# Patient Record
Sex: MALE | Race: WHITE | Employment: FULL TIME | URBAN - METROPOLITAN AREA
[De-identification: names, ages, dates, MRNs, and addresses within clinical notes are randomized per-mention and may not be internally consistent; named-entity substitution may affect disease eponyms.]

---

## 2018-07-10 ENCOUNTER — OFFICE VISIT (OUTPATIENT)
Dept: INTERNAL MEDICINE CLINIC | Facility: CLINIC | Age: 33
End: 2018-07-10

## 2018-07-10 ENCOUNTER — LAB ENCOUNTER (OUTPATIENT)
Dept: LAB | Age: 33
End: 2018-07-10
Attending: INTERNAL MEDICINE
Payer: COMMERCIAL

## 2018-07-10 VITALS
HEART RATE: 60 BPM | WEIGHT: 165 LBS | BODY MASS INDEX: 25.9 KG/M2 | HEIGHT: 67 IN | DIASTOLIC BLOOD PRESSURE: 66 MMHG | SYSTOLIC BLOOD PRESSURE: 110 MMHG | OXYGEN SATURATION: 99 % | TEMPERATURE: 99 F

## 2018-07-10 DIAGNOSIS — Z86.69 HISTORY OF CORNEAL ULCER: ICD-10-CM

## 2018-07-10 DIAGNOSIS — R39.11 DELAYED ONSET OF URINATION: ICD-10-CM

## 2018-07-10 DIAGNOSIS — Z00.00 PHYSICAL EXAM: ICD-10-CM

## 2018-07-10 DIAGNOSIS — Z00.00 PHYSICAL EXAM: Primary | ICD-10-CM

## 2018-07-10 LAB
ALBUMIN SERPL BCP-MCNC: 4.9 G/DL (ref 3.5–4.8)
ALBUMIN/GLOB SERPL: 2 {RATIO} (ref 1–2)
ALP SERPL-CCNC: 70 U/L (ref 32–100)
ALT SERPL-CCNC: 27 U/L (ref 17–63)
ANION GAP SERPL CALC-SCNC: 9 MMOL/L (ref 0–18)
AST SERPL-CCNC: 21 U/L (ref 15–41)
BASOPHILS # BLD: 0 K/UL (ref 0–0.2)
BASOPHILS NFR BLD: 1 %
BILIRUB SERPL-MCNC: 1.8 MG/DL (ref 0.3–1.2)
BILIRUB UR QL: NEGATIVE
BUN SERPL-MCNC: 19 MG/DL (ref 8–20)
BUN/CREAT SERPL: 19.8 (ref 10–20)
CALCIUM SERPL-MCNC: 10 MG/DL (ref 8.5–10.5)
CHLORIDE SERPL-SCNC: 100 MMOL/L (ref 95–110)
CHOLEST SERPL-MCNC: 184 MG/DL (ref 110–200)
CLARITY UR: CLEAR
CO2 SERPL-SCNC: 28 MMOL/L (ref 22–32)
COLOR UR: YELLOW
CREAT SERPL-MCNC: 0.96 MG/DL (ref 0.5–1.5)
EOSINOPHIL # BLD: 0.1 K/UL (ref 0–0.7)
EOSINOPHIL NFR BLD: 3 %
ERYTHROCYTE [DISTWIDTH] IN BLOOD BY AUTOMATED COUNT: 12.3 % (ref 11–15)
GLOBULIN PLAS-MCNC: 2.5 G/DL (ref 2.5–3.7)
GLUCOSE SERPL-MCNC: 87 MG/DL (ref 70–99)
GLUCOSE UR-MCNC: NEGATIVE MG/DL
HCT VFR BLD AUTO: 44.9 % (ref 41–52)
HDLC SERPL-MCNC: 68 MG/DL
HGB BLD-MCNC: 15.8 G/DL (ref 13.5–17.5)
HGB UR QL STRIP.AUTO: NEGATIVE
KETONES UR-MCNC: NEGATIVE MG/DL
LDLC SERPL CALC-MCNC: 105 MG/DL (ref 0–99)
LEUKOCYTE ESTERASE UR QL STRIP.AUTO: NEGATIVE
LYMPHOCYTES # BLD: 1.1 K/UL (ref 1–4)
LYMPHOCYTES NFR BLD: 31 %
MCH RBC QN AUTO: 33.1 PG (ref 27–32)
MCHC RBC AUTO-ENTMCNC: 35.1 G/DL (ref 32–37)
MCV RBC AUTO: 94.2 FL (ref 80–100)
MONOCYTES # BLD: 0.3 K/UL (ref 0–1)
MONOCYTES NFR BLD: 9 %
NEUTROPHILS # BLD AUTO: 2.1 K/UL (ref 1.8–7.7)
NEUTROPHILS NFR BLD: 57 %
NITRITE UR QL STRIP.AUTO: NEGATIVE
NONHDLC SERPL-MCNC: 116 MG/DL
OSMOLALITY UR CALC.SUM OF ELEC: 286 MOSM/KG (ref 275–295)
PATIENT FASTING: YES
PH UR: 5 [PH] (ref 5–8)
PLATELET # BLD AUTO: 248 K/UL (ref 140–400)
PMV BLD AUTO: 9.4 FL (ref 7.4–10.3)
POTASSIUM SERPL-SCNC: 4.9 MMOL/L (ref 3.3–5.1)
PROT SERPL-MCNC: 7.4 G/DL (ref 5.9–8.4)
PROT UR-MCNC: NEGATIVE MG/DL
PSA SERPL-MCNC: 0.7 NG/ML (ref 0–4)
RBC # BLD AUTO: 4.76 M/UL (ref 4.5–5.9)
SODIUM SERPL-SCNC: 137 MMOL/L (ref 136–144)
SP GR UR STRIP: 1.02 (ref 1–1.03)
TRIGL SERPL-MCNC: 54 MG/DL (ref 1–149)
TSH SERPL-ACNC: 1.6 UIU/ML (ref 0.45–5.33)
UROBILINOGEN UR STRIP-ACNC: <2
VIT C UR-MCNC: NEGATIVE MG/DL
WBC # BLD AUTO: 3.6 K/UL (ref 4–11)

## 2018-07-10 PROCEDURE — 84402 ASSAY OF FREE TESTOSTERONE: CPT

## 2018-07-10 PROCEDURE — 80053 COMPREHEN METABOLIC PANEL: CPT

## 2018-07-10 PROCEDURE — 36415 COLL VENOUS BLD VENIPUNCTURE: CPT

## 2018-07-10 PROCEDURE — 85025 COMPLETE CBC W/AUTO DIFF WBC: CPT

## 2018-07-10 PROCEDURE — 84443 ASSAY THYROID STIM HORMONE: CPT

## 2018-07-10 PROCEDURE — 84403 ASSAY OF TOTAL TESTOSTERONE: CPT

## 2018-07-10 PROCEDURE — 80061 LIPID PANEL: CPT

## 2018-07-10 PROCEDURE — 82306 VITAMIN D 25 HYDROXY: CPT

## 2018-07-10 PROCEDURE — 99385 PREV VISIT NEW AGE 18-39: CPT | Performed by: INTERNAL MEDICINE

## 2018-07-10 PROCEDURE — 81003 URINALYSIS AUTO W/O SCOPE: CPT | Performed by: INTERNAL MEDICINE

## 2018-07-10 PROCEDURE — 86812 HLA TYPING A B OR C: CPT

## 2018-07-10 NOTE — PROGRESS NOTES
Diaz Gaming is a 29year old male who presents for a complete physical exam.   HPI:   Pt complains of:    Patient presents with:  New Patient: Pt here today with complaints of feeling but sensation to urinate followed with delay in starting stream dyspnea and wheezing. Cardio: Negative Chest pain and irregular heartbeat/palpitations. GI: Negative for Abdominal pain, constipation, diarrhea, heartburn, nausea and vomiting. : Negative for Dysuria and urinary frequency.   Endocrine: Negative for Col PLATELET; Future  - COMP METABOLIC PANEL (14); Future  - LIPID PANEL; Future  - ASSAY, THYROID STIM HORMONE; Future  - URINALYSIS WITH CULTURE REFLEX  - VITAMIN D, 25-HYDROXY; Future  - PSA SCREEN; Future  - TESTOSTERONE,FREE AND TOTAL;  Future  - HLA B 27

## 2018-07-11 LAB
25(OH)D3 SERPL-MCNC: 30.6 NG/ML
HLA-B27: NEGATIVE

## 2018-07-12 LAB
TESTOSTERONE, FREE, S: 8.72 NG/DL
TESTOSTERONE, TOTAL, S: 323 NG/DL

## 2018-07-13 ENCOUNTER — TELEPHONE (OUTPATIENT)
Dept: INTERNAL MEDICINE CLINIC | Facility: CLINIC | Age: 33
End: 2018-07-13

## 2018-07-13 DIAGNOSIS — R79.89 LOW TESTOSTERONE: Primary | ICD-10-CM

## 2018-12-12 ENCOUNTER — TELEPHONE (OUTPATIENT)
Dept: INTERNAL MEDICINE CLINIC | Facility: CLINIC | Age: 33
End: 2018-12-12

## 2018-12-12 NOTE — TELEPHONE ENCOUNTER
Dr. Puneet Blanchard was working with Jose Jeong to clear inbasket items and Sandra Jerez was not notified in July of Testosterone Lab Test that was ordered, ok to wait until next appt or do you want the clinical staff to reach out to him regarding this test?     Routed to

## 2021-11-05 ENCOUNTER — APPOINTMENT (OUTPATIENT)
Dept: URBAN - METROPOLITAN AREA CLINIC 321 | Age: 36
Setting detail: DERMATOLOGY
End: 2021-11-08

## 2021-11-05 DIAGNOSIS — L72.8 OTHER FOLLICULAR CYSTS OF THE SKIN AND SUBCUTANEOUS TISSUE: ICD-10-CM

## 2021-11-05 PROCEDURE — 11900 INJECT SKIN LESIONS </W 7: CPT

## 2021-11-05 PROCEDURE — OTHER COUNSELING: OTHER

## 2021-11-05 PROCEDURE — OTHER INTRALESIONAL KENALOG: OTHER

## 2021-11-05 ASSESSMENT — LOCATION DETAILED DESCRIPTION DERM: LOCATION DETAILED: NASAL ROOT

## 2021-11-05 ASSESSMENT — LOCATION ZONE DERM: LOCATION ZONE: NOSE

## 2021-11-05 ASSESSMENT — LOCATION SIMPLE DESCRIPTION DERM: LOCATION SIMPLE: NOSE

## 2021-12-03 ENCOUNTER — APPOINTMENT (OUTPATIENT)
Dept: URBAN - METROPOLITAN AREA CLINIC 321 | Age: 36
Setting detail: DERMATOLOGY
End: 2021-12-03

## 2021-12-03 DIAGNOSIS — L72.8 OTHER FOLLICULAR CYSTS OF THE SKIN AND SUBCUTANEOUS TISSUE: ICD-10-CM

## 2021-12-03 PROCEDURE — OTHER COUNSELING: OTHER

## 2021-12-03 PROCEDURE — OTHER INTRALESIONAL KENALOG: OTHER

## 2021-12-03 PROCEDURE — 11900 INJECT SKIN LESIONS </W 7: CPT

## 2021-12-03 ASSESSMENT — LOCATION SIMPLE DESCRIPTION DERM: LOCATION SIMPLE: NOSE

## 2021-12-03 ASSESSMENT — LOCATION DETAILED DESCRIPTION DERM: LOCATION DETAILED: NASAL ROOT

## 2021-12-03 ASSESSMENT — LOCATION ZONE DERM: LOCATION ZONE: NOSE

## 2022-10-18 ENCOUNTER — TELEPHONE (OUTPATIENT)
Dept: INTERNAL MEDICINE CLINIC | Facility: CLINIC | Age: 37
End: 2022-10-18

## 2022-10-18 DIAGNOSIS — Z00.00 ANNUAL PHYSICAL EXAM: Primary | ICD-10-CM

## 2022-10-18 NOTE — TELEPHONE ENCOUNTER
LMTCB.  please inform labs have been ordered and to do them fasting at least 1 week prior to appointment so Dr Maggi Mcginnis can discuss results with him at time of office visit.

## 2022-10-18 NOTE — TELEPHONE ENCOUNTER
See message below received from patient      Annual Physical scheduled with Dr Davian Martines on Nov 7 - Also I would like lab work and discuss what I am looking to monitor.

## 2022-10-31 ENCOUNTER — LAB ENCOUNTER (OUTPATIENT)
Dept: LAB | Facility: HOSPITAL | Age: 37
End: 2022-10-31
Attending: INTERNAL MEDICINE
Payer: COMMERCIAL

## 2022-10-31 DIAGNOSIS — Z00.00 ANNUAL PHYSICAL EXAM: ICD-10-CM

## 2022-10-31 LAB
ALBUMIN SERPL-MCNC: 4.2 G/DL (ref 3.4–5)
ALBUMIN/GLOB SERPL: 1.4 {RATIO} (ref 1–2)
ALP LIVER SERPL-CCNC: 88 U/L
ALT SERPL-CCNC: 45 U/L
ANION GAP SERPL CALC-SCNC: 5 MMOL/L (ref 0–18)
AST SERPL-CCNC: 21 U/L (ref 15–37)
BASOPHILS # BLD AUTO: 0.02 X10(3) UL (ref 0–0.2)
BASOPHILS NFR BLD AUTO: 0.5 %
BILIRUB SERPL-MCNC: 1.2 MG/DL (ref 0.1–2)
BUN BLD-MCNC: 25 MG/DL (ref 7–18)
BUN/CREAT SERPL: 23.4 (ref 10–20)
CALCIUM BLD-MCNC: 9.3 MG/DL (ref 8.5–10.1)
CHLORIDE SERPL-SCNC: 106 MMOL/L (ref 98–112)
CHOLEST SERPL-MCNC: 159 MG/DL (ref ?–200)
CO2 SERPL-SCNC: 27 MMOL/L (ref 21–32)
CREAT BLD-MCNC: 1.07 MG/DL
DEPRECATED RDW RBC AUTO: 42.4 FL (ref 35.1–46.3)
EOSINOPHIL # BLD AUTO: 0.12 X10(3) UL (ref 0–0.7)
EOSINOPHIL NFR BLD AUTO: 3 %
ERYTHROCYTE [DISTWIDTH] IN BLOOD BY AUTOMATED COUNT: 11.8 % (ref 11–15)
FASTING PATIENT LIPID ANSWER: YES
FASTING STATUS PATIENT QL REPORTED: YES
GFR SERPLBLD BASED ON 1.73 SQ M-ARVRAT: 93 ML/MIN/1.73M2 (ref 60–?)
GLOBULIN PLAS-MCNC: 3 G/DL (ref 2.8–4.4)
GLUCOSE BLD-MCNC: 98 MG/DL (ref 70–99)
HCT VFR BLD AUTO: 44.3 %
HDLC SERPL-MCNC: 59 MG/DL (ref 40–59)
HGB BLD-MCNC: 15 G/DL
IMM GRANULOCYTES # BLD AUTO: 0.01 X10(3) UL (ref 0–1)
IMM GRANULOCYTES NFR BLD: 0.3 %
LDLC SERPL CALC-MCNC: 83 MG/DL (ref ?–100)
LYMPHOCYTES # BLD AUTO: 1.04 X10(3) UL (ref 1–4)
LYMPHOCYTES NFR BLD AUTO: 26.1 %
MCH RBC QN AUTO: 33 PG (ref 26–34)
MCHC RBC AUTO-ENTMCNC: 33.9 G/DL (ref 31–37)
MCV RBC AUTO: 97.4 FL
MONOCYTES # BLD AUTO: 0.45 X10(3) UL (ref 0.1–1)
MONOCYTES NFR BLD AUTO: 11.3 %
NEUTROPHILS # BLD AUTO: 2.34 X10 (3) UL (ref 1.5–7.7)
NEUTROPHILS # BLD AUTO: 2.34 X10(3) UL (ref 1.5–7.7)
NEUTROPHILS NFR BLD AUTO: 58.8 %
NONHDLC SERPL-MCNC: 100 MG/DL (ref ?–130)
OSMOLALITY SERPL CALC.SUM OF ELEC: 290 MOSM/KG (ref 275–295)
PLATELET # BLD AUTO: 258 10(3)UL (ref 150–450)
POTASSIUM SERPL-SCNC: 4.3 MMOL/L (ref 3.5–5.1)
PROT SERPL-MCNC: 7.2 G/DL (ref 6.4–8.2)
RBC # BLD AUTO: 4.55 X10(6)UL
SODIUM SERPL-SCNC: 138 MMOL/L (ref 136–145)
TRIGL SERPL-MCNC: 95 MG/DL (ref 30–149)
TSI SER-ACNC: 1.36 MIU/ML (ref 0.36–3.74)
VIT D+METAB SERPL-MCNC: 33.9 NG/ML (ref 30–100)
VLDLC SERPL CALC-MCNC: 15 MG/DL (ref 0–30)
WBC # BLD AUTO: 4 X10(3) UL (ref 4–11)

## 2022-10-31 PROCEDURE — 80061 LIPID PANEL: CPT

## 2022-10-31 PROCEDURE — 85025 COMPLETE CBC W/AUTO DIFF WBC: CPT

## 2022-10-31 PROCEDURE — 84403 ASSAY OF TOTAL TESTOSTERONE: CPT

## 2022-10-31 PROCEDURE — 80053 COMPREHEN METABOLIC PANEL: CPT

## 2022-10-31 PROCEDURE — 82306 VITAMIN D 25 HYDROXY: CPT

## 2022-10-31 PROCEDURE — 84402 ASSAY OF FREE TESTOSTERONE: CPT

## 2022-10-31 PROCEDURE — 84443 ASSAY THYROID STIM HORMONE: CPT

## 2022-10-31 PROCEDURE — 36415 COLL VENOUS BLD VENIPUNCTURE: CPT

## 2022-11-03 ENCOUNTER — OFFICE VISIT (OUTPATIENT)
Dept: INTERNAL MEDICINE CLINIC | Facility: CLINIC | Age: 37
End: 2022-11-03
Payer: COMMERCIAL

## 2022-11-03 VITALS
OXYGEN SATURATION: 99 % | HEIGHT: 65.6 IN | WEIGHT: 164.63 LBS | DIASTOLIC BLOOD PRESSURE: 66 MMHG | HEART RATE: 67 BPM | TEMPERATURE: 98 F | BODY MASS INDEX: 26.77 KG/M2 | SYSTOLIC BLOOD PRESSURE: 120 MMHG

## 2022-11-03 DIAGNOSIS — Z00.00 PHYSICAL EXAM: Primary | ICD-10-CM

## 2022-11-03 PROCEDURE — 3008F BODY MASS INDEX DOCD: CPT | Performed by: INTERNAL MEDICINE

## 2022-11-03 PROCEDURE — 90471 IMMUNIZATION ADMIN: CPT | Performed by: INTERNAL MEDICINE

## 2022-11-03 PROCEDURE — 99395 PREV VISIT EST AGE 18-39: CPT | Performed by: INTERNAL MEDICINE

## 2022-11-03 PROCEDURE — 3074F SYST BP LT 130 MM HG: CPT | Performed by: INTERNAL MEDICINE

## 2022-11-03 PROCEDURE — 90686 IIV4 VACC NO PRSV 0.5 ML IM: CPT | Performed by: INTERNAL MEDICINE

## 2022-11-03 PROCEDURE — 3078F DIAST BP <80 MM HG: CPT | Performed by: INTERNAL MEDICINE

## 2022-11-03 NOTE — PATIENT INSTRUCTIONS
1. Physical exam  Physical exam instruction: Improve diet and exercise, lets wait on the finality of the lab tests, I do like the idea of continue to clean eating, maybe boosting up the vitamin D by taking a supplement of 2555-0959 international units daily, find a reputable brand online. Also all the increasing fertility measures should be implemented at this point, and if there is trouble with fertility, let me know.

## 2022-11-08 LAB
TESTOSTERONE, FREE, S: 17.2 NG/DL
TESTOSTERONE, TOTAL, S: 484 NG/DL

## 2023-03-20 ENCOUNTER — PATIENT MESSAGE (OUTPATIENT)
Dept: INTERNAL MEDICINE CLINIC | Facility: CLINIC | Age: 38
End: 2023-03-20

## 2023-03-21 NOTE — TELEPHONE ENCOUNTER
From: Garrick Lee  To: Kell Ortega DO  Sent: 3/20/2023 8:09 PM CDT  Subject: Trouble getting erection    Hi Doc,   My wife and I trying to have a baby as of last month. I have never had a problem getting an erection and the past 6 weeks it has been a massive struggle. To the point where I just can't get it to happen, no matter what. I have had some borderline low T results in the past and I am wanting to talk to you about this ongoing situation as it is becoming very challenging for us. Let me know if you want me to make an appointment or what you would like me to do.      Thank you,   Beni Villarreal

## 2023-05-23 ENCOUNTER — OFFICE VISIT (OUTPATIENT)
Dept: INTERNAL MEDICINE CLINIC | Facility: CLINIC | Age: 38
End: 2023-05-23

## 2023-05-23 VITALS
HEIGHT: 65.6 IN | HEART RATE: 52 BPM | OXYGEN SATURATION: 99 % | SYSTOLIC BLOOD PRESSURE: 120 MMHG | WEIGHT: 154 LBS | DIASTOLIC BLOOD PRESSURE: 82 MMHG | BODY MASS INDEX: 25.05 KG/M2 | TEMPERATURE: 99 F

## 2023-05-23 DIAGNOSIS — N52.9 ERECTILE DYSFUNCTION, UNSPECIFIED ERECTILE DYSFUNCTION TYPE: Primary | ICD-10-CM

## 2023-05-23 DIAGNOSIS — Z31.41 FERTILITY TESTING: ICD-10-CM

## 2023-05-23 PROCEDURE — 3008F BODY MASS INDEX DOCD: CPT | Performed by: INTERNAL MEDICINE

## 2023-05-23 PROCEDURE — 99214 OFFICE O/P EST MOD 30 MIN: CPT | Performed by: INTERNAL MEDICINE

## 2023-05-23 PROCEDURE — 3074F SYST BP LT 130 MM HG: CPT | Performed by: INTERNAL MEDICINE

## 2023-05-23 PROCEDURE — 3079F DIAST BP 80-89 MM HG: CPT | Performed by: INTERNAL MEDICINE

## 2023-05-23 RX ORDER — SILDENAFIL CITRATE 20 MG/1
TABLET ORAL DAILY PRN
Qty: 30 TABLET | Refills: 1 | Status: SHIPPED | OUTPATIENT
Start: 2023-05-23

## 2023-05-23 NOTE — PATIENT INSTRUCTIONS
1. Erectile dysfunction, unspecified erectile dysfunction type  Lets see if we can augment things here with 1 to 2 tablets at a time of sildenafil, expect some mild side effects afterwards like headache, this should be mild in nature, be aware of all the classic warning signs we discussed for any side effects of this medication. I do not like the idea of mixing it with alcohol, but making sure we are taking it 30 minutes prior to the activity seems to make the medication work the most optimal  - sildenafil 20 MG Oral Tab; Take 2-3 tablets (40-60 mg total) by mouth daily as needed. Dispense: 30 tablet; Refill: 1    2. Fertility testing  Need to push ahead with fertility testing is only triggered by your wife's specialist , Let me know if we need to do fertility testing on you. Skyrizi Counseling: I discussed with the patient the risks of risankizumab-rzaa including but not limited to immunosuppression, and serious infections.  The patient understands that monitoring is required including a PPD at baseline and must alert us or the primary physician if symptoms of infection or other concerning signs are noted.

## 2023-06-09 ENCOUNTER — PATIENT MESSAGE (OUTPATIENT)
Dept: INTERNAL MEDICINE CLINIC | Facility: CLINIC | Age: 38
End: 2023-06-09

## 2023-06-09 NOTE — TELEPHONE ENCOUNTER
Spoke with patient states his wife had pink eye last week. Patient states yesterday both of his eyes were pink, crusting, watery. Patient denies pain or itching at this time. Patient states his wife had a little medication left over so he started using it. Patient requesting something for pink eye.  To Dr. Mitchell Gaines please advise    [address]  [place]

## 2023-06-09 NOTE — TELEPHONE ENCOUNTER
Left message to call back to get more information about symptoms and find out what medication he was using

## 2023-06-10 RX ORDER — POLYMYXIN B SULFATE AND TRIMETHOPRIM 1; 10000 MG/ML; [USP'U]/ML
SOLUTION OPHTHALMIC
Qty: 1 EACH | Refills: 0 | Status: SHIPPED | OUTPATIENT
Start: 2023-06-10

## 2023-06-10 NOTE — TELEPHONE ENCOUNTER
Discussed with patient. He was waiting for a prescription for eyedrops to be called into his pharmacy. I called and eyedrops for him. He has crustiness and discharge both eyes.

## 2024-02-08 ENCOUNTER — OFFICE VISIT (OUTPATIENT)
Dept: INTERNAL MEDICINE CLINIC | Facility: CLINIC | Age: 39
End: 2024-02-08

## 2024-02-08 VITALS
HEART RATE: 62 BPM | TEMPERATURE: 99 F | DIASTOLIC BLOOD PRESSURE: 76 MMHG | HEIGHT: 65.6 IN | OXYGEN SATURATION: 99 % | WEIGHT: 154.19 LBS | SYSTOLIC BLOOD PRESSURE: 120 MMHG | BODY MASS INDEX: 25.08 KG/M2

## 2024-02-08 DIAGNOSIS — Z23 NEED FOR VACCINATION: ICD-10-CM

## 2024-02-08 DIAGNOSIS — Z00.00 PHYSICAL EXAM: Primary | ICD-10-CM

## 2024-02-08 PROCEDURE — 90471 IMMUNIZATION ADMIN: CPT | Performed by: INTERNAL MEDICINE

## 2024-02-08 PROCEDURE — 90715 TDAP VACCINE 7 YRS/> IM: CPT | Performed by: INTERNAL MEDICINE

## 2024-02-08 PROCEDURE — 99395 PREV VISIT EST AGE 18-39: CPT | Performed by: INTERNAL MEDICINE

## 2024-02-08 NOTE — PATIENT INSTRUCTIONS
1. Physical exam  Physical exam instruction: Improve diet and exercise, complete fasting labs in the near future and you will be called with results 5-7 days after completed, call with questions.  Call the central scheduling number at [phone] to schedule at any of the [facility] locations    - CBC With Differential With Platelet; Future  - Comp Metabolic Panel (14); Future  - Lipid Panel; Future  - Testosterone,Total and Weakly Bound w/ SHBG; Future  - TSH W Reflex To Free T4; Future  - Urinalysis with Culture Reflex  - Vitamin D; Future    2. Need for vaccination  Lets get the Tdap shot, think about in 1 week doing a flu shot, maybe shortly after that a COVID booster, otherwise should be up-to-date.  - TdaP (Boostrix/Adacel) Vaccine (> 7 Y)

## 2024-02-09 NOTE — PROGRESS NOTES
Carlos Steele is a 38 year old male who presents for a complete physical exam.   HPI:   Pt complains of:    Chief Complaint   Patient presents with    Physical     Annual Px, exercise with peleton and weights.  Diet is eating healthy, no eating out.  Meal prep for the week.  Nicotine mints, still, using 5 per day.  Caffiene one cup per day   Patient is here today for physical exam, patient is up-to-date with age-related standard of care screening and vaccination recommendations, this was discussed at length and they verbalized understanding of any deficiencies.  Patient does claim his wife is due for their first child in April of this year, a baby girl, not yet named    Wt Readings from Last 3 Encounters:   02/08/24 154 lb 3.2 oz (69.9 kg)   05/23/23 154 lb (69.9 kg)   11/03/22 164 lb 9.6 oz (74.7 kg)       Current Outpatient Medications   Medication Sig Dispense Refill    sildenafil 20 MG Oral Tab Take 2-3 tablets (40-60 mg total) by mouth daily as needed. 30 tablet 1      Past Medical History:   Diagnosis Date    Corneal ulcer 5/13/2009    Corneal ulcer 11/7/2011    Corneal ulcer of right eye 12/18/14    Infection 2014    infection of left axilla    MGD (meibomian gland dysfunction) 2008    Myopia 1996      Past Surgical History:   Procedure Laterality Date    OTHER SURGICAL HISTORY Left 2014    debridement infection of left axilla      Family History   Problem Relation Age of Onset    Cancer Maternal Grandmother     Glaucoma Neg         family h/o    Diabetes Neg         family h/o    Macular degeneration Neg         family h/o    Retinal detachment Neg         family h/o      Social History:  Social History     Socioeconomic History    Marital status:    Tobacco Use    Smoking status: Former     Packs/day: 0.25     Years: 8.00     Additional pack years: 0.00     Total pack years: 2.00     Types: Cigarettes     Quit date: 12/30/2013     Years since quitting: 10.1    Smokeless tobacco: Former      Types: Chew     Quit date: 1/1/2018    Tobacco comments:     smoked .25 units for 3 years   Vaping Use    Vaping Use: Never used   Substance and Sexual Activity    Alcohol use: Yes     Alcohol/week: 8.0 - 10.0 standard drinks of alcohol     Types: 8 - 10 Standard drinks or equivalent per week    Drug use: Yes     Types: Cannabis     Comment: couple times/week    Sexual activity: Yes     Birth control/protection: Condom   Other Topics Concern    Caffeine Concern No         REVIEW OF SYSTEMS:   REVIEW OF SYSTEMS:  Constitutional: Negative for Chills, fatigue, fever, malaise, weight gain and weight loss.  ENMT: Negative for Nasal drainage and sinus pressure.  Eyes: Negative for Vision changes.  Respiratory: Negative for Cough, dyspnea and wheezing.  Cardio: Negative Chest pain and irregular heartbeat/palpitations.  GI: Negative for Abdominal pain, constipation, diarrhea, heartburn, nausea and vomiting.  : Negative for Dysuria and urinary frequency.  Endocrine: Negative for Cold intolerance and heat intolerance.  Neuro: Negative for Gait disturbance and memory impairment.  Psych: Negative for Anxiety and depression.  Integumentary: Negative for Hives and rash.  MS: Negative muscle weakness. negative for joint pain  Hema/Lymph: Negative Easy bleeding and easy bruising.  Allergic/Immuno: Negative Environmental allergies and food allergies.    EXAM:   /76   Pulse 62   Temp 98.5 °F (36.9 °C)   Ht 5' 5.6\" (1.666 m)   Wt 154 lb 3.2 oz (69.9 kg)   SpO2 99%   BMI 25.19 kg/m²   Body mass index is 25.19 kg/m².   PHYSICAL EXAMINATION:  Vital signs: See chart   Gen. exam: Alert and oriented, in no acute distress   HEENT: Pupils equal and reactive to light and accommodation, moist mucous membranes  Neck exam:  Supple with baseline range of motion.  Normal thyroid trachea midline, no JVD  Heart exam: Regular rate and rhythm no murmurs no S3 no S4   Lung exam: No rales no rhonchi no wheezes  Abdominal exam: Soft  nontender nondistended positive bowel sounds are normoactive  Extremities exam: no clubbing no cyanosis no edema  Skin exam: No obvious wounds, no rashes  Neurological exam: Cranial nerves II through XII intact, no gross deficits  Musculoskeletal exam: no Arthritis appreciated, no obvious deformity  : prostate not examined    ASSESSMENT AND PLAN:   Carlos Steele is a 38 year old male who presents for a complete physical exam.  1. Physical exam  Physical exam instruction: Improve diet and exercise, complete fasting labs in the near future and you will be called with results 5-7 days after completed, call with questions.  Call the central scheduling number at [phone] to schedule at any of the [facility] locations    - CBC With Differential With Platelet; Future  - Comp Metabolic Panel (14); Future  - Lipid Panel; Future  - Testosterone,Total and Weakly Bound w/ SHBG; Future  - TSH W Reflex To Free T4; Future  - Urinalysis with Culture Reflex  - Vitamin D; Future    2. Need for vaccination  Lets get the Tdap shot, think about in 1 week doing a flu shot, maybe shortly after that a COVID booster, otherwise should be up-to-date.  - TdaP (Boostrix/Adacel) Vaccine (> 7 Y)      Spent 45 minutes obtaining history, evaluating patient, discussing treatment options, diet, exercise, review of available labs and radiology reports, and completing documentation.    Jeff Anthony D'Amico, DO  2/8/2024  7:01 PM

## 2024-03-08 ENCOUNTER — LAB ENCOUNTER (OUTPATIENT)
Dept: LAB | Age: 39
End: 2024-03-08
Attending: INTERNAL MEDICINE
Payer: COMMERCIAL

## 2024-03-08 ENCOUNTER — PATIENT MESSAGE (OUTPATIENT)
Dept: INTERNAL MEDICINE CLINIC | Facility: CLINIC | Age: 39
End: 2024-03-08

## 2024-03-08 DIAGNOSIS — Z00.00 PHYSICAL EXAM: ICD-10-CM

## 2024-03-08 LAB
ALBUMIN SERPL-MCNC: 4.9 G/DL (ref 3.2–4.8)
ALBUMIN/GLOB SERPL: 1.8 {RATIO} (ref 1–2)
ALP LIVER SERPL-CCNC: 82 U/L
ALT SERPL-CCNC: 27 U/L
ANION GAP SERPL CALC-SCNC: 0 MMOL/L (ref 0–18)
AST SERPL-CCNC: 20 U/L (ref ?–34)
BASOPHILS # BLD AUTO: 0.02 X10(3) UL (ref 0–0.2)
BASOPHILS NFR BLD AUTO: 0.5 %
BILIRUB SERPL-MCNC: 3.3 MG/DL (ref 0.3–1.2)
BILIRUB UR QL: NEGATIVE
BUN BLD-MCNC: 22 MG/DL (ref 9–23)
BUN/CREAT SERPL: 19.5 (ref 10–20)
CALCIUM BLD-MCNC: 10.3 MG/DL (ref 8.7–10.4)
CHLORIDE SERPL-SCNC: 106 MMOL/L (ref 98–112)
CHOLEST SERPL-MCNC: 175 MG/DL (ref ?–200)
CLARITY UR: CLEAR
CO2 SERPL-SCNC: 30 MMOL/L (ref 21–32)
COLOR UR: COLORLESS
CREAT BLD-MCNC: 1.13 MG/DL
DEPRECATED RDW RBC AUTO: 40.9 FL (ref 35.1–46.3)
EGFRCR SERPLBLD CKD-EPI 2021: 86 ML/MIN/1.73M2 (ref 60–?)
EOSINOPHIL # BLD AUTO: 0.13 X10(3) UL (ref 0–0.7)
EOSINOPHIL NFR BLD AUTO: 3 %
ERYTHROCYTE [DISTWIDTH] IN BLOOD BY AUTOMATED COUNT: 12.1 % (ref 11–15)
FASTING PATIENT LIPID ANSWER: YES
FASTING STATUS PATIENT QL REPORTED: YES
GLOBULIN PLAS-MCNC: 2.7 G/DL (ref 2.8–4.4)
GLUCOSE BLD-MCNC: 93 MG/DL (ref 70–99)
GLUCOSE UR-MCNC: NORMAL MG/DL
HCT VFR BLD AUTO: 45.4 %
HDLC SERPL-MCNC: 77 MG/DL (ref 40–59)
HGB BLD-MCNC: 15.6 G/DL
HGB UR QL STRIP.AUTO: NEGATIVE
IMM GRANULOCYTES # BLD AUTO: 0.01 X10(3) UL (ref 0–1)
IMM GRANULOCYTES NFR BLD: 0.2 %
KETONES UR-MCNC: NEGATIVE MG/DL
LDLC SERPL CALC-MCNC: 87 MG/DL (ref ?–100)
LEUKOCYTE ESTERASE UR QL STRIP.AUTO: NEGATIVE
LYMPHOCYTES # BLD AUTO: 1.27 X10(3) UL (ref 1–4)
LYMPHOCYTES NFR BLD AUTO: 29.1 %
MCH RBC QN AUTO: 31.6 PG (ref 26–34)
MCHC RBC AUTO-ENTMCNC: 34.4 G/DL (ref 31–37)
MCV RBC AUTO: 91.9 FL
MONOCYTES # BLD AUTO: 0.4 X10(3) UL (ref 0.1–1)
MONOCYTES NFR BLD AUTO: 9.2 %
NEUTROPHILS # BLD AUTO: 2.53 X10 (3) UL (ref 1.5–7.7)
NEUTROPHILS # BLD AUTO: 2.53 X10(3) UL (ref 1.5–7.7)
NEUTROPHILS NFR BLD AUTO: 58 %
NITRITE UR QL STRIP.AUTO: NEGATIVE
NONHDLC SERPL-MCNC: 98 MG/DL (ref ?–130)
OSMOLALITY SERPL CALC.SUM OF ELEC: 285 MOSM/KG (ref 275–295)
PH UR: 7 [PH] (ref 5–8)
PLATELET # BLD AUTO: 317 10(3)UL (ref 150–450)
POTASSIUM SERPL-SCNC: 4.4 MMOL/L (ref 3.5–5.1)
PROT SERPL-MCNC: 7.6 G/DL (ref 5.7–8.2)
PROT UR-MCNC: NEGATIVE MG/DL
RBC # BLD AUTO: 4.94 X10(6)UL
SODIUM SERPL-SCNC: 136 MMOL/L (ref 136–145)
SP GR UR STRIP: 1 (ref 1–1.03)
TRIGL SERPL-MCNC: 57 MG/DL (ref 30–149)
TSI SER-ACNC: 1.4 MIU/ML (ref 0.55–4.78)
UROBILINOGEN UR STRIP-ACNC: NORMAL
VIT D+METAB SERPL-MCNC: 27 NG/ML (ref 30–100)
VLDLC SERPL CALC-MCNC: 9 MG/DL (ref 0–30)
WBC # BLD AUTO: 4.4 X10(3) UL (ref 4–11)

## 2024-03-08 PROCEDURE — 84410 TESTOSTERONE BIOAVAILABLE: CPT

## 2024-03-08 PROCEDURE — 80061 LIPID PANEL: CPT

## 2024-03-08 PROCEDURE — 85025 COMPLETE CBC W/AUTO DIFF WBC: CPT

## 2024-03-08 PROCEDURE — 81003 URINALYSIS AUTO W/O SCOPE: CPT | Performed by: INTERNAL MEDICINE

## 2024-03-08 PROCEDURE — 80053 COMPREHEN METABOLIC PANEL: CPT

## 2024-03-08 PROCEDURE — 82306 VITAMIN D 25 HYDROXY: CPT

## 2024-03-08 PROCEDURE — 36415 COLL VENOUS BLD VENIPUNCTURE: CPT

## 2024-03-08 PROCEDURE — 84443 ASSAY THYROID STIM HORMONE: CPT

## 2024-03-11 ENCOUNTER — TELEPHONE (OUTPATIENT)
Dept: INTERNAL MEDICINE CLINIC | Facility: CLINIC | Age: 39
End: 2024-03-11

## 2024-03-11 DIAGNOSIS — E80.4 GILBERT SYNDROME: Primary | ICD-10-CM

## 2024-03-11 NOTE — TELEPHONE ENCOUNTER
From: Carlos Steele  To: Jeff Anthony D'Amico  Sent: 3/8/2024 9:41 PM CST  Subject: Bilirubin Look high?     Hey Doc, my Bilirubin level something to be concerned about? Let me know if you want me to retest something or what your thoughts are.    Thank you,  Tim

## 2024-03-11 NOTE — TELEPHONE ENCOUNTER
----- Message from Carlos Steele sent at 3/8/2024  9:41 PM CST -----  Regarding: Bilirubin Look high?   Contact: [phone]  Pascale Doc, my Bilirubin level something to be concerned about? Let me know if you want me to retest something or what your thoughts are.    Thank you,  Tim

## 2024-03-12 ENCOUNTER — TELEPHONE (OUTPATIENT)
Dept: INTERNAL MEDICINE CLINIC | Facility: CLINIC | Age: 39
End: 2024-03-12

## 2024-03-12 ENCOUNTER — PATIENT MESSAGE (OUTPATIENT)
Dept: INTERNAL MEDICINE CLINIC | Facility: CLINIC | Age: 39
End: 2024-03-12

## 2024-03-12 DIAGNOSIS — Z82.49 FAMILY HISTORY OF EARLY CAD: Primary | ICD-10-CM

## 2024-03-12 PROBLEM — E80.4 GILBERT SYNDROME: Status: ACTIVE | Noted: 2024-03-12

## 2024-03-12 NOTE — TELEPHONE ENCOUNTER
----- Message from Carlos Steele sent at 3/12/2024  4:42 PM CDT -----  Regarding: Your last message  Contact: [phone]  Hi thanks for letting me know. Is there anything else it could be outside of that? Is there anything else I can test to potentially rule out anything else? I am not going to lie, I had a family friend pass away unexpected from a heart issue at 35 last week so now my red flags go up. If there is another test of some sort to rule out anyting outside of this I am happy to do it even if insurance doesn’t cover. I appreciate your insight!

## 2024-03-13 LAB
SEX HORM BIND GLOB: 31.7 NMOL/L
TESTOST % FREE+WEAK BND: 23.3 %
TESTOST FREE+WEAK BND: 108.6 NG/DL
TESTOSTERONE TOT /MS: 466 NG/DL

## 2024-11-08 ENCOUNTER — TELEPHONE (OUTPATIENT)
Dept: INTERNAL MEDICINE CLINIC | Facility: CLINIC | Age: 39
End: 2024-11-08

## 2024-11-08 DIAGNOSIS — R79.89 LOW TESTOSTERONE: ICD-10-CM

## 2024-11-08 DIAGNOSIS — Z00.00 ANNUAL PHYSICAL EXAM: Primary | ICD-10-CM

## 2024-11-08 DIAGNOSIS — E55.9 VITAMIN D DEFICIENCY: ICD-10-CM

## 2025-02-07 ENCOUNTER — OFFICE VISIT (OUTPATIENT)
Dept: INTERNAL MEDICINE CLINIC | Facility: CLINIC | Age: 40
End: 2025-02-07
Payer: COMMERCIAL

## 2025-02-07 VITALS
TEMPERATURE: 99 F | HEART RATE: 69 BPM | DIASTOLIC BLOOD PRESSURE: 62 MMHG | BODY MASS INDEX: 24.46 KG/M2 | SYSTOLIC BLOOD PRESSURE: 104 MMHG | OXYGEN SATURATION: 97 % | HEIGHT: 66 IN | WEIGHT: 152.19 LBS

## 2025-02-07 DIAGNOSIS — Z00.00 PHYSICAL EXAM: Primary | ICD-10-CM

## 2025-02-07 DIAGNOSIS — J01.00 ACUTE NON-RECURRENT MAXILLARY SINUSITIS: ICD-10-CM

## 2025-02-07 DIAGNOSIS — E80.4 GILBERT SYNDROME: ICD-10-CM

## 2025-02-07 DIAGNOSIS — J06.9 UPPER RESPIRATORY TRACT INFECTION, UNSPECIFIED TYPE: ICD-10-CM

## 2025-02-07 PROCEDURE — 99395 PREV VISIT EST AGE 18-39: CPT | Performed by: INTERNAL MEDICINE

## 2025-02-07 RX ORDER — CLOMIPHENE CITRATE 50 MG/1
50 TABLET ORAL
COMMUNITY
Start: 2025-01-01

## 2025-02-07 RX ORDER — AZITHROMYCIN 250 MG/1
TABLET, FILM COATED ORAL
Qty: 6 TABLET | Refills: 0 | Status: SHIPPED | OUTPATIENT
Start: 2025-02-07 | End: 2025-02-12

## 2025-02-07 NOTE — PROGRESS NOTES
Carlos Steele is a 39 year old male who presents for a complete physical exam.   HPI:   Pt complains of:    Chief Complaint   Patient presents with    Physical     Annual physical  Reviewed Preventative/Wellness form with patient.     Cough     C/o dry cough for past 5 days     Patient is here today for physical exam, patient is up-to-date with age-related standard of care screening and vaccination recommendations, this was discussed at length and they verbalized understanding of any deficiencies.    Wt Readings from Last 3 Encounters:   25 152 lb 3.2 oz (69 kg)   24 154 lb 3.2 oz (69.9 kg)   23 154 lb (69.9 kg)       Current Outpatient Medications   Medication Sig Dispense Refill    clomiPHENE Citrate 50 MG Oral Tab Take 1 tablet (50 mg total) by mouth Every Monday, Wednesday, and Friday. States take 56 mg's      azithromycin (ZITHROMAX Z-MITA) 250 MG Oral Tab Take 2 tablets (500 mg total) by mouth daily for 1 day, THEN 1 tablet (250 mg total) daily for 4 days. 6 tablet 0      Past Medical History:    Allergic rhinitis    Corneal ulcer    Corneal ulcer    Corneal ulcer of right eye    Infection    infection of left axilla    MGD (meibomian gland dysfunction)    Myopia      Past Surgical History:   Procedure Laterality Date    Other surgical history Left     debridement infection of left axilla      Family History   Problem Relation Age of Onset    Cancer Maternal Grandmother     Stroke Mother     Glaucoma Neg         family h/o    Diabetes Neg         family h/o    Macular degeneration Neg         family h/o    Retinal detachment Neg         family h/o      Social History:  Social History     Socioeconomic History    Marital status:    Tobacco Use    Smoking status: Former     Current packs/day: 0.00     Average packs/day: 0.3 packs/day for 8.0 years (2.0 ttl pk-yrs)     Types: Cigarettes     Start date: 2005     Quit date: 2013     Years since quittin.1      Passive exposure: Past    Smokeless tobacco: Former     Types: Chew     Quit date: 1/1/2018    Tobacco comments:     smoked .25 units for 3 years   Vaping Use    Vaping status: Never Used   Substance and Sexual Activity    Alcohol use: Not Currently     Alcohol/week: 8.0 - 10.0 standard drinks of alcohol     Comment: no alcohol since 11/18/24    Drug use: Yes     Types: Cannabis     Comment: couple times/week    Sexual activity: Yes     Birth control/protection: Condom   Other Topics Concern    Caffeine Concern No     Social Drivers of Health     Food Insecurity: No Food Insecurity (2/7/2025)    NCSS - Food Insecurity     Worried About Running Out of Food in the Last Year: No     Ran Out of Food in the Last Year: No   Transportation Needs: No Transportation Needs (2/7/2025)    NCSS - Transportation     Lack of Transportation: No   Housing Stability: Not At Risk (2/7/2025)    NCSS - Housing/Utilities     Has Housing: Yes     Worried About Losing Housing: No     Unable to Get Utilities: No         REVIEW OF SYSTEMS:   REVIEW OF SYSTEMS:  Constitutional: Negative for Chills, fatigue, fever, malaise, weight gain and weight loss.  ENMT: Negative for Nasal drainage and sinus pressure.  Eyes: Negative for Vision changes.  Respiratory: Negative for Cough, dyspnea and wheezing.  Cardio: Negative Chest pain and irregular heartbeat/palpitations.  GI: Negative for Abdominal pain, constipation, diarrhea, heartburn, nausea and vomiting.  : Negative for Dysuria and urinary frequency.  Endocrine: Negative for Cold intolerance and heat intolerance.  Neuro: Negative for Gait disturbance and memory impairment.  Psych: Negative for Anxiety and depression.  Integumentary: Negative for Hives and rash.  MS: Negative muscle weakness. negative for joint pain  Hema/Lymph: Negative Easy bleeding and easy bruising.  Allergic/Immuno: Negative Environmental allergies and food allergies.    EXAM:   /62 (BP Location: Left arm, Patient  Position: Sitting, Cuff Size: adult)   Pulse 69   Temp 98.5 °F (36.9 °C) (Oral)   Ht 5' 6\" (1.676 m)   Wt 152 lb 3.2 oz (69 kg)   SpO2 97%   BMI 24.57 kg/m²   Body mass index is 24.57 kg/m².   PHYSICAL EXAMINATION:  Vital signs: See chart   Gen. exam: Alert and oriented, in no acute distress   HEENT: Pupils equal and reactive to light and accommodation, moist mucous membranes  Neck exam:  Supple with baseline range of motion.  Normal thyroid trachea midline, no JVD  Heart exam: Regular rate and rhythm no murmurs no S3 no S4   Lung exam: No rales no rhonchi no wheezes  Abdominal exam: Soft nontender nondistended positive bowel sounds are normoactive  Extremities exam: no clubbing no cyanosis no edema  Skin exam: No obvious wounds, no rashes  Neurological exam: Cranial nerves II through XII intact, no gross deficits  Musculoskeletal exam: no Arthritis appreciated, no obvious deformity  : prostate not examined    ASSESSMENT AND PLAN:   Carlos Steele is a 39 year old male who presents for a complete physical exam.  1. Physical exam  Physical exam instruction: Improve diet and exercise, I need a copy of the labs you have.    2. 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 Acute non-recurrent maxillary sinusitis  Lets use the Z-Mita to clear things, I also like the idea of the guaifenesin based syrups  - azithromycin (ZITHROMAX Z-MITA) 250 MG Oral Tab; Take 2 tablets (500 mg total) by mouth daily for 1 day, THEN 1 tablet (250 mg total) daily for 4 days.  Dispense: 6 tablet; Refill: 0    5.  Testosterone replacement  Lets continue with some mild dosages here through your outside clinic let me know if I need to get involved, lets send me the recent lab work if you can.    Spent 45 minutes obtaining history, evaluating patient, discussing treatment options, diet, exercise, review of available labs and radiology reports,  and completing documentation.    Jeff Anthony D'Amico, DO  2/7/2025  2:58 PM

## 2025-02-07 NOTE — PATIENT INSTRUCTIONS
1. Physical exam  Physical exam instruction: Improve diet and exercise, I need a copy of the labs you have.    2. 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 Acute non-recurrent maxillary sinusitis  Lets use the Z-Mita to clear things, I also like the idea of the guaifenesin based syrups  - azithromycin (ZITHROMAX Z-MITA) 250 MG Oral Tab; Take 2 tablets (500 mg total) by mouth daily for 1 day, THEN 1 tablet (250 mg total) daily for 4 days.  Dispense: 6 tablet; Refill: 0    5.  Testosterone replacement  Lets continue with some mild dosages here through your outside clinic let me know if I need to get involved, lets send me the recent lab work if you can.

## 2025-05-27 RX ORDER — CLOMIPHENE CITRATE 50 MG/1
50 TABLET ORAL
Refills: 0 | Status: CANCELLED | OUTPATIENT
Start: 2025-05-28

## 2025-05-30 NOTE — TELEPHONE ENCOUNTER
Not prescribed by our office     Current refill request refused due to refill is either a duplicate request or has active refills at the pharmacy.  Check previous templates.    Requested Prescriptions     Pending Prescriptions Disp Refills    clomiPHENE Citrate 50 MG Oral Tab  0     Sig: Take 1 tablet (50 mg total) by mouth Every Monday, Wednesday, and Friday. States take 56 mg's